# Patient Record
Sex: FEMALE | Race: BLACK OR AFRICAN AMERICAN | NOT HISPANIC OR LATINO | URBAN - METROPOLITAN AREA
[De-identification: names, ages, dates, MRNs, and addresses within clinical notes are randomized per-mention and may not be internally consistent; named-entity substitution may affect disease eponyms.]

---

## 2024-11-12 ENCOUNTER — NURSING HOME VISIT (OUTPATIENT)
Dept: POST ACUTE CARE | Facility: EXTERNAL LOCATION | Age: 74
End: 2024-11-12
Payer: MEDICARE

## 2024-11-12 DIAGNOSIS — D64.9 ANEMIA, UNSPECIFIED TYPE: ICD-10-CM

## 2024-11-12 DIAGNOSIS — B99.9 INFECTION: ICD-10-CM

## 2024-11-12 DIAGNOSIS — E13.69 OTHER SPECIFIED DIABETES MELLITUS WITH OTHER SPECIFIED COMPLICATION, UNSPECIFIED WHETHER LONG TERM INSULIN USE (MULTI): ICD-10-CM

## 2024-11-12 DIAGNOSIS — C84.70 ANAPLASTIC ALK-NEGATIVE LARGE CELL LYMPHOMA, UNSPECIFIED BODY REGION (MULTI): Primary | ICD-10-CM

## 2024-11-12 DIAGNOSIS — I10 ESSENTIAL (PRIMARY) HYPERTENSION: ICD-10-CM

## 2024-11-12 DIAGNOSIS — E78.5 HYPERLIPIDEMIA, UNSPECIFIED HYPERLIPIDEMIA TYPE: ICD-10-CM

## 2024-11-12 DIAGNOSIS — J44.9 CHRONIC OBSTRUCTIVE PULMONARY DISEASE, UNSPECIFIED COPD TYPE (MULTI): ICD-10-CM

## 2024-11-12 PROBLEM — R53.1 WEAKNESS: Status: ACTIVE | Noted: 2024-11-12

## 2024-11-12 PROCEDURE — 99305 1ST NF CARE MODERATE MDM 35: CPT | Performed by: INTERNAL MEDICINE

## 2024-11-12 NOTE — ASSESSMENT & PLAN NOTE
Monitor for signs symptoms of hyper or hypoglycemia.  Monitor labs  LCS diet.  Glucoscan  Metformin  Sliding scale insulin  Insulin glargine

## 2024-11-12 NOTE — LETTER
Patient: Eliza Palafox  : 1950    Encounter Date: 2024    HISTORY & PHYSICAL    Subjective  Chief complaint: Eliza Palafox is a 74 y.o. female who is being seen and evaluated for multiple medical problems.  Patient admitted to SNF for therapy due to weakness after recent hospitalization.    HPI:  HPI  Patient is a 74-year-old female presenting for admission to nursing facility following hospitalization.  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  Patient is currently receiving treatment and follows with oncology hematology.  Patient did have recent procedure for cancer with debridement of area through nasal cavity, continues on antibiotics to complete outpatient.  PT OT evaluated patient with recommendations for further therapy at SNF.  Patient was seen and examined at the bedside, appears to be in no acute distress.  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    Family History   Problem Relation Name Age of Onset   • No Known Problems Mother     • No Known Problems Father         Social History     Socioeconomic History   • Marital status:        Vital signs: Vital signs reviewed per  facility EHR    Objective  Physical Exam  Constitutional:       General: She is not in acute distress.  Eyes:      Extraocular Movements: Extraocular movements intact.   Cardiovascular:      Rate and Rhythm: Normal rate and regular rhythm.   Pulmonary:      Effort: Pulmonary effort is normal.      Breath sounds: Normal breath sounds.   Abdominal:      General: Bowel sounds are normal.      Palpations: Abdomen is soft.   Musculoskeletal:      Cervical back: Neck supple.      Right lower leg: No edema.      Left lower leg: No edema.   Neurological:      Mental Status: She is alert.   Psychiatric:         Mood and Affect: Mood normal.         Behavior: Behavior is cooperative.         Assessment/Plan  Problem List Items Addressed This Visit       COPD (chronic obstructive pulmonary disease) (Multi)     Monitor for shortness of breath or wheezing.  Inhaler         Essential (primary) hypertension     Monitor blood pressure         Diabetes mellitus (Multi)     Monitor for signs symptoms of hyper or hypoglycemia.  Monitor labs  LCS diet.  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, unspecified site (Multi) - Primary     With mets  Follows outpatient  Receiving treatment  Pain control         Anemia     Monitor labs         Hyperlipidemia     Statin  Ezetimibe  Monitor labs         Infection     Continue amoxicillin, doxycycline          Hospital records reviewed  Medications, treatments, and labs reviewed  Continue medications and treatments as listed in EMR  Discussed with nursing and therapy      Scribe Attestation  I, Nick Berry   attest that this documentation has been prepared under the direction and in the presence of Janae Ivan MD    Provider Attestation - Scribe documentation  All medical record entries made by the Scribe were at my direction and personally dictated by me. I have reviewed the chart and agree that the record  accurately reflects my personal performance of the history, physical exam, discussion and plan.   Janae Ivan MD      Electronically Signed By: Janae Ivan MD   11/13/24  1:01 PM

## 2024-11-12 NOTE — PROGRESS NOTES
HISTORY & PHYSICAL    Subjective   Chief complaint: Eliza Palafox is a 74 y.o. female who is being seen and evaluated for multiple medical problems.  Patient admitted to SNF for therapy due to weakness after recent hospitalization.    HPI:  HPI  Patient is a 74-year-old female presenting for admission to nursing facility following hospitalization.  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  Patient is currently receiving treatment and follows with oncology hematology.  Patient did have recent procedure for cancer with debridement of area through nasal cavity, continues on antibiotics to complete outpatient.  PT OT evaluated patient with recommendations for further therapy at SNF.  Patient was seen and examined at the bedside, appears to be in no acute distress.  Denies chest pain, shortness of breath, nausea vomiting fever chills  Past Medical History:   Diagnosis Date    Anaplastic large cell lymphoma, ALK-negative, unspecified site (Multi)     Anemia     CHF (congestive heart failure)     COPD (chronic obstructive pulmonary disease) (Multi)     Diabetes mellitus (Multi)     Essential (primary) hypertension 12/31/2013    Benign essential hypertension    Hyperlipidemia     Malignant neoplasm of unspecified part of right bronchus or lung (Multi)     CESAR (obstructive sleep apnea)     Personal history of other endocrine, nutritional and metabolic disease 11/11/2014    History of diabetes mellitus    Sciatica        No past surgical history on file.    Family History   Problem Relation Name Age of Onset    No Known Problems Mother      No Known Problems Father         Social History     Socioeconomic History    Marital status:        Vital signs: Vital signs reviewed per facility EHR    Objective   Physical Exam  Constitutional:       General: She is not in  acute distress.  Eyes:      Extraocular Movements: Extraocular movements intact.   Cardiovascular:      Rate and Rhythm: Normal rate and regular rhythm.   Pulmonary:      Effort: Pulmonary effort is normal.      Breath sounds: Normal breath sounds.   Abdominal:      General: Bowel sounds are normal.      Palpations: Abdomen is soft.   Musculoskeletal:      Cervical back: Neck supple.      Right lower leg: No edema.      Left lower leg: No edema.   Neurological:      Mental Status: She is alert.   Psychiatric:         Mood and Affect: Mood normal.         Behavior: Behavior is cooperative.         Assessment/Plan   Problem List Items Addressed This Visit       COPD (chronic obstructive pulmonary disease) (Multi)     Monitor for shortness of breath or wheezing.  Inhaler         Essential (primary) hypertension     Monitor blood pressure         Diabetes mellitus (Multi)     Monitor for signs symptoms of hyper or hypoglycemia.  Monitor labs  LCS diet.  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, unspecified site (Multi) - Primary     With mets  Follows outpatient  Receiving treatment  Pain control         Anemia     Monitor labs         Hyperlipidemia     Statin  Ezetimibe  Monitor labs         Infection     Continue amoxicillin, doxycycline          Hospital records reviewed  Medications, treatments, and labs reviewed  Continue medications and treatments as listed in EMR  Discussed with nursing and therapy      Scribe Attestation  I, Justo Berryibemerson   attest that this documentation has been prepared under the direction and in the presence of Janae Ivan MD    Provider Attestation - Scribe documentation  All medical record entries made by the Scribe were at my direction and personally dictated by me. I have reviewed the chart and agree that the record accurately reflects my personal performance of the history, physical exam, discussion  and plan.   Janae Ivan MD

## 2024-11-13 ENCOUNTER — NURSING HOME VISIT (OUTPATIENT)
Dept: POST ACUTE CARE | Facility: EXTERNAL LOCATION | Age: 74
End: 2024-11-13
Payer: MEDICARE

## 2024-11-13 DIAGNOSIS — R53.1 WEAKNESS: ICD-10-CM

## 2024-11-13 DIAGNOSIS — I10 ESSENTIAL (PRIMARY) HYPERTENSION: ICD-10-CM

## 2024-11-13 DIAGNOSIS — J44.9 CHRONIC OBSTRUCTIVE PULMONARY DISEASE, UNSPECIFIED COPD TYPE (MULTI): Primary | ICD-10-CM

## 2024-11-13 DIAGNOSIS — D64.9 ANEMIA, UNSPECIFIED TYPE: ICD-10-CM

## 2024-11-13 PROCEDURE — 99308 SBSQ NF CARE LOW MDM 20: CPT | Performed by: REGISTERED NURSE

## 2024-11-13 NOTE — LETTER
Patient: Eliza Palafox  : 1950    Encounter Date: 2024    PROGRESS NOTE    Subjective  Chief complaint: Eliza Palafox is a 74 y.o. female who is an acute skilled patient being seen and evaluated for weakness    HPI:  Patient in therapy d/t generalized weakness. Patient presents for f/u. Continues to work toward goals in therapy. No new complaints at this time.    Objective  Vital signs: 129/66, 97.8, 133, 18, 98%    Physical Exam  Constitutional:       General: She is not in acute distress.  Eyes:      Extraocular Movements: Extraocular movements intact.   Pulmonary:      Effort: Pulmonary effort is normal.   Musculoskeletal:      Cervical back: Neck supple.   Neurological:      Mental Status: She is alert.   Psychiatric:         Mood and Affect: Mood normal.         Behavior: Behavior is cooperative.         Assessment/Plan  Problem List Items Addressed This Visit       COPD (chronic obstructive pulmonary disease) (Multi) - Primary     Monitor for shortness of breath or wheezing.  Inhaler         Essential (primary) hypertension     Monitor blood pressure  Continue current medications          Weakness     Therapy          Anemia     Monitor labs          Medications, treatments, and labs reviewed  Continue medications and treatments as listed in [facility]    Scribe Attestation  I, Nick Hayes   attest that this documentation has been prepared under the direction and in the presence of DREA Chapman.    Provider Attestation - Scribe documentation  All medical record entries made by the Scribe were at my direction and personally dictated by me. I have reviewed the chart and agree that the record accurately reflects my personal performance of the history, physical exam, discussion and plan.    DREA Chapman        Electronically Signed By: DREA Chapman   24  3:56 PM

## 2024-11-14 ENCOUNTER — NURSING HOME VISIT (OUTPATIENT)
Dept: POST ACUTE CARE | Facility: EXTERNAL LOCATION | Age: 74
End: 2024-11-14
Payer: MEDICARE

## 2024-11-14 DIAGNOSIS — J44.9 CHRONIC OBSTRUCTIVE PULMONARY DISEASE, UNSPECIFIED COPD TYPE (MULTI): ICD-10-CM

## 2024-11-14 DIAGNOSIS — I10 ESSENTIAL (PRIMARY) HYPERTENSION: ICD-10-CM

## 2024-11-14 DIAGNOSIS — C84.70 ANAPLASTIC ALK-NEGATIVE LARGE CELL LYMPHOMA, UNSPECIFIED BODY REGION (MULTI): Primary | ICD-10-CM

## 2024-11-14 DIAGNOSIS — E13.69 OTHER SPECIFIED DIABETES MELLITUS WITH OTHER SPECIFIED COMPLICATION, UNSPECIFIED WHETHER LONG TERM INSULIN USE (MULTI): ICD-10-CM

## 2024-11-14 DIAGNOSIS — R53.1 WEAKNESS: ICD-10-CM

## 2024-11-14 PROCEDURE — 99309 SBSQ NF CARE MODERATE MDM 30: CPT | Performed by: INTERNAL MEDICINE

## 2024-11-14 NOTE — PROGRESS NOTES
PROGRESS NOTE    Subjective   Chief complaint: Eliza Palafox is a 74 y.o. female who is an acute skilled patient being seen and evaluated for weakness    HPI:  Patient in therapy d/t generalized weakness. Patient presents for f/u. Continues to work toward goals in therapy. No new complaints at this time.    Objective   Vital signs: 129/66, 97.8, 133, 18, 98%    Physical Exam  Constitutional:       General: She is not in acute distress.  Eyes:      Extraocular Movements: Extraocular movements intact.   Pulmonary:      Effort: Pulmonary effort is normal.   Musculoskeletal:      Cervical back: Neck supple.   Neurological:      Mental Status: She is alert.   Psychiatric:         Mood and Affect: Mood normal.         Behavior: Behavior is cooperative.         Assessment/Plan   Problem List Items Addressed This Visit       COPD (chronic obstructive pulmonary disease) (Multi) - Primary     Monitor for shortness of breath or wheezing.  Inhaler         Essential (primary) hypertension     Monitor blood pressure  Continue current medications          Weakness     Therapy          Anemia     Monitor labs          Medications, treatments, and labs reviewed  Continue medications and treatments as listed in [facility]    Scribe Attestation  [place] Scribe   attest that this documentation has been prepared under the direction and in the presence of DREA Chapman.    Provider Attestation - Scribe documentation  All medical record entries made by the Scribe were at my direction and personally dictated by me. I have reviewed the chart and agree that the record accurately reflects my personal performance of the history, physical exam, discussion and plan.    DREA Chapman

## 2024-11-14 NOTE — PROGRESS NOTES
PROGRESS NOTE    Subjective   Chief complaint: Eliza Palafox is a 74 y.o. female who is an acute skilled patient being seen and evaluated for weakness    HPI:  HPI  Patient presents for f/u therapy and general medical care.  Patient seen and examined at bedside.  Therapy has been working with the patient to improve strength, endurance, ADLs, and transfers d/t generalized weakness.  Patient is working towards goals.  Patient noted with ulcer to left ankle, wound team is following.  Patient did have a follow-up with oncology and was recommended for possible hospice and patient refusing hospice at this time.    Objective   Vital signs: 129/66, 18, 98% blood sugar 146    Physical Exam  Constitutional:       General: She is not in acute distress.  Eyes:      Extraocular Movements: Extraocular movements intact.   Cardiovascular:      Rate and Rhythm: Normal rate and regular rhythm.   Pulmonary:      Effort: Pulmonary effort is normal.      Breath sounds: Normal breath sounds.   Abdominal:      General: Bowel sounds are normal.      Palpations: Abdomen is soft.   Musculoskeletal:      Cervical back: Neck supple.      Right lower leg: No edema.      Left lower leg: No edema.   Neurological:      Mental Status: She is alert.      Motor: Weakness present.   Psychiatric:         Mood and Affect: Mood normal.         Behavior: Behavior is cooperative.         Assessment/Plan   Problem List Items Addressed This Visit       COPD (chronic obstructive pulmonary disease) (Multi)     Monitor for shortness of breath or wheezing.  Inhaler         Essential (primary) hypertension     Monitor blood pressure         Diabetes mellitus (Multi)     Monitor for signs symptoms of hyper or hypoglycemia.  Monitor labs  LCS diet.  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, unspecified site (Multi) - Primary     With mets  Follows outpatient  Receiving treatment  Pain  control         Weakness     Continue therapy          Medications, treatments, and labs reviewed  Continue medications and treatments as listed in EMR      Scribe Attestation  I, Nick Berry   attest that this documentation has been prepared under the direction and in the presence of Janae Ivan MD    Provider Attestation - Scribe documentation  All medical record entries made by the Scribe were at my direction and personally dictated by me. I have reviewed the chart and agree that the record accurately reflects my personal performance of the history, physical exam, discussion and plan.   Janae Ivan MD

## 2024-11-14 NOTE — LETTER
Patient: Eliza Palafox  : 1950    Encounter Date: 2024    PROGRESS NOTE    Subjective  Chief complaint: Eliza Palafox is a 74 y.o. female who is an acute skilled patient being seen and evaluated for weakness    HPI:  HPI  Patient presents for f/u therapy and general medical care.  Patient seen and examined at bedside.  Therapy has been working with the patient to improve strength, endurance, ADLs, and transfers d/t generalized weakness.  Patient is working towards goals.  Patient noted with ulcer to left ankle, wound team is following.  Patient did have a follow-up with oncology and was recommended for possible hospice and patient refusing hospice at this time.    Objective  Vital signs: 129/66, 18, 98% blood sugar 146    Physical Exam  Constitutional:       General: She is not in acute distress.  Eyes:      Extraocular Movements: Extraocular movements intact.   Cardiovascular:      Rate and Rhythm: Normal rate and regular rhythm.   Pulmonary:      Effort: Pulmonary effort is normal.      Breath sounds: Normal breath sounds.   Abdominal:      General: Bowel sounds are normal.      Palpations: Abdomen is soft.   Musculoskeletal:      Cervical back: Neck supple.      Right lower leg: No edema.      Left lower leg: No edema.   Neurological:      Mental Status: She is alert.      Motor: Weakness present.   Psychiatric:         Mood and Affect: Mood normal.         Behavior: Behavior is cooperative.         Assessment/Plan  Problem List Items Addressed This Visit       COPD (chronic obstructive pulmonary disease) (Multi)     Monitor for shortness of breath or wheezing.  Inhaler         Essential (primary) hypertension     Monitor blood pressure         Diabetes mellitus (Multi)     Monitor for signs symptoms of hyper or hypoglycemia.  Monitor labs  LCS diet.  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, unspecified site (Multi) -  Primary     With mets  Follows outpatient  Receiving treatment  Pain control         Weakness     Continue therapy          Medications, treatments, and labs reviewed  Continue medications and treatments as listed in EMR      Scribe Attestation  I, Nick Berry   attest that this documentation has been prepared under the direction and in the presence of Janae Ivan MD    Provider Attestation - Scribe documentation  All medical record entries made by the Scribe were at my direction and personally dictated by me. I have reviewed the chart and agree that the record accurately reflects my personal performance of the history, physical exam, discussion and plan.   Janae Ivan MD        Electronically Signed By: Janae Ivan MD   11/15/24 11:47 AM

## 2024-11-20 ENCOUNTER — NURSING HOME VISIT (OUTPATIENT)
Dept: POST ACUTE CARE | Facility: EXTERNAL LOCATION | Age: 74
End: 2024-11-20
Payer: MEDICARE

## 2024-11-20 DIAGNOSIS — C84.70 ANAPLASTIC ALK-NEGATIVE LARGE CELL LYMPHOMA, UNSPECIFIED BODY REGION (MULTI): ICD-10-CM

## 2024-11-20 DIAGNOSIS — R53.1 WEAKNESS: Primary | ICD-10-CM

## 2024-11-20 DIAGNOSIS — J44.9 CHRONIC OBSTRUCTIVE PULMONARY DISEASE, UNSPECIFIED COPD TYPE (MULTI): ICD-10-CM

## 2024-11-20 PROCEDURE — 99308 SBSQ NF CARE LOW MDM 20: CPT | Performed by: REGISTERED NURSE

## 2024-11-20 NOTE — LETTER
Patient: Eliza Palafox  : 1950    Encounter Date: 2024    PROGRESS NOTE    Subjective  Chief complaint: Eliza Palafox is a 74 y.o. female who is an acute skilled patient being seen and evaluated for weakness    HPI:  Patient in therapy d/t generalized weakness. Patient presents for f/u. Continues to work toward goals in therapy. No new complaints at this time.    Objective  Vital signs: 120/60, 98, 90, 16, 96%    Physical Exam  Constitutional:       General: She is not in acute distress.  Eyes:      Extraocular Movements: Extraocular movements intact.   Pulmonary:      Effort: Pulmonary effort is normal.   Musculoskeletal:      Cervical back: Neck supple.   Neurological:      Mental Status: She is alert.   Psychiatric:         Mood and Affect: Mood normal.         Behavior: Behavior is cooperative.         Assessment/Plan  Problem List Items Addressed This Visit       COPD (chronic obstructive pulmonary disease) (Multi)     Monitor for shortness of breath or wheezing.  Inhaler         Anaplastic large cell lymphoma, ALK-negative, unspecified site (Multi)     With mets  Follows outpatient  Receiving treatment  Pain control         Weakness - Primary     Therapy           Medications, treatments, and labs reviewed  Continue medications and treatments as listed in [facility]    Scribe Attestation  I, Nick Hayes   attest that this documentation has been prepared under the direction and in the presence of DREA Chapman.    Provider Attestation - Scribe documentation  All medical record entries made by the Scribe were at my direction and personally dictated by me. I have reviewed the chart and agree that the record accurately reflects my personal performance of the history, physical exam, discussion and plan.    DREA Chapman        Electronically Signed By: DREA Chapman   24  8:27 AM  
Mother/Patient

## 2024-11-21 ENCOUNTER — NURSING HOME VISIT (OUTPATIENT)
Dept: POST ACUTE CARE | Facility: EXTERNAL LOCATION | Age: 74
End: 2024-11-21
Payer: MEDICARE

## 2024-11-21 DIAGNOSIS — L03.90 CELLULITIS, UNSPECIFIED CELLULITIS SITE: Primary | ICD-10-CM

## 2024-11-21 DIAGNOSIS — I10 ESSENTIAL (PRIMARY) HYPERTENSION: ICD-10-CM

## 2024-11-21 DIAGNOSIS — J44.9 CHRONIC OBSTRUCTIVE PULMONARY DISEASE, UNSPECIFIED COPD TYPE (MULTI): ICD-10-CM

## 2024-11-21 DIAGNOSIS — C84.70 ANAPLASTIC ALK-NEGATIVE LARGE CELL LYMPHOMA, UNSPECIFIED BODY REGION (MULTI): ICD-10-CM

## 2024-11-21 DIAGNOSIS — E13.69 OTHER SPECIFIED DIABETES MELLITUS WITH OTHER SPECIFIED COMPLICATION, UNSPECIFIED WHETHER LONG TERM INSULIN USE (MULTI): ICD-10-CM

## 2024-11-21 DIAGNOSIS — E78.5 HYPERLIPIDEMIA, UNSPECIFIED HYPERLIPIDEMIA TYPE: ICD-10-CM

## 2024-11-21 PROCEDURE — 99305 1ST NF CARE MODERATE MDM 35: CPT | Performed by: INTERNAL MEDICINE

## 2024-11-21 NOTE — PROGRESS NOTES
HISTORY & PHYSICAL    Subjective   Chief complaint: Eliza Palafox is a 74 y.o. female who is being seen and evaluated for multiple medical problems.  Patient admitted to SNF for therapy due to weakness after recent hospitalization.    HPI:  HPI  Patient is a 74-year-old female presenting for admission nursing facility following hospitalization.  Patient has a past medical history significant for anaplastic large cell lymphoma, COPD, CKD, diabetes, hypertension, hyperlipidemia, CESAR.  Patient was started on antibiotics for cellulitis, tolerating without adverse effects.  Patient was hemodynamically stable to discharge to SNF for continued medical management and further therapy.  Patient seen and examined at the bedside, appears to be in no acute distress.  Denies chest pain, shortness of breath, nausea vomiting fever chills.  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    Family History   Problem Relation Name Age of Onset    No Known Problems Mother      No Known Problems Father         Social History     Socioeconomic History    Marital status:        Vital signs: 120/60, 90, 16, 98.0, 96% blood sugar 150    Objective   Physical Exam  Constitutional:       General: She is not in acute distress.  Eyes:      Extraocular Movements: Extraocular movements intact.   Cardiovascular:      Rate and Rhythm: Normal rate and regular rhythm.    Pulmonary:      Effort: Pulmonary effort is normal.      Breath sounds: Normal breath sounds.   Abdominal:      General: Bowel sounds are normal.      Palpations: Abdomen is soft.   Musculoskeletal:      Cervical back: Neck supple.      Right lower leg: No edema.      Left lower leg: No edema.   Neurological:      Mental Status: She is alert.      Motor: Weakness present.   Psychiatric:         Mood and Affect: Mood normal.         Behavior: Behavior is cooperative.         Assessment/Plan   Problem List Items Addressed This Visit       COPD (chronic obstructive pulmonary disease) (Multi)     Monitor for shortness of breath or wheezing.  Inhaler         Essential (primary) hypertension     Monitor blood pressure         Diabetes mellitus (Multi)     Monitor for signs symptoms of hyper or hypoglycemia.  Monitor labs  LCS diet.  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, unspecified site (Multi)     With mets  Follows outpatient  Receiving treatment  Pain control         Hyperlipidemia     Statin  Ezetimibe  Monitor labs         Cellulitis - Primary     Clindamycin 11/22/2024   Sulfamethoxazole 11/24/2024  Start amoxicillin tomorrow and continue thru 12/5/2024  Start doxycycline tomorrow and continue thru 12/5/2024          Hospital records reviewed  Medications, treatments, and labs reviewed  Continue medications and treatments as listed in EMR  Discussed with nursing and therapy      Scribe Attestation  I, Praveena Robledo Scribe   attest that this documentation has been prepared under the direction and in the presence of Janae Ivan MD    Provider Attestation - Scribe documentation  All medical record entries made by the Scribe were at my direction and personally dictated by me. I have reviewed the chart and agree that the record accurately reflects my personal performance of the history, physical exam, discussion and plan.   Janae MACKENZIE  MD [place]

## 2024-11-21 NOTE — ASSESSMENT & PLAN NOTE
Clindamycin 11/22/2024   Sulfamethoxazole 11/24/2024  Start amoxicillin tomorrow and continue thru 12/5/2024  Start doxycycline tomorrow and continue thru 12/5/2024

## 2024-11-21 NOTE — LETTER
Patient: Eliza Palafox  : 1950    Encounter Date: 2024    HISTORY & PHYSICAL    Subjective  Chief complaint: Eliza Palafox is a 74 y.o. female who is being seen and evaluated for multiple medical problems.  Patient admitted to SNF for therapy due to weakness after recent hospitalization.    HPI:  HPI  Patient is a 74-year-old female presenting for admission nursing facility following hospitalization.  Patient has a past medical history significant for anaplastic large cell lymphoma, COPD, CKD, diabetes, hypertension, hyperlipidemia, CESAR.  Patient was started on antibiotics for cellulitis, tolerating without adverse effects.  Patient was hemodynamically stable to discharge to SNF for continued medical management and further therapy.  Patient seen and examined at the bedside, appears to be in no acute distress.  Denies chest pain, shortness of breath, nausea vomiting fever chills.  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    Family History   Problem Relation Name Age of Onset   • No Known Problems Mother     • No Known Problems Father         Social History     Socioeconomic History   • Marital status:        Vital signs: 120/60, 90, 16, 98.0, 96% blood sugar 150    Objective  Physical Exam  Constitutional:       General: She is not in acute distress.  Eyes:      Extraocular Movements: Extraocular movements  intact.   Cardiovascular:      Rate and Rhythm: Normal rate and regular rhythm.   Pulmonary:      Effort: Pulmonary effort is normal.      Breath sounds: Normal breath sounds.   Abdominal:      General: Bowel sounds are normal.      Palpations: Abdomen is soft.   Musculoskeletal:      Cervical back: Neck supple.      Right lower leg: No edema.      Left lower leg: No edema.   Neurological:      Mental Status: She is alert.      Motor: Weakness present.   Psychiatric:         Mood and Affect: Mood normal.         Behavior: Behavior is cooperative.         Assessment/Plan  Problem List Items Addressed This Visit       COPD (chronic obstructive pulmonary disease) (Multi)     Monitor for shortness of breath or wheezing.  Inhaler         Essential (primary) hypertension     Monitor blood pressure         Diabetes mellitus (Multi)     Monitor for signs symptoms of hyper or hypoglycemia.  Monitor labs  LCS diet.  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, unspecified site (Multi)     With mets  Follows outpatient  Receiving treatment  Pain control         Hyperlipidemia     Statin  Ezetimibe  Monitor labs         Cellulitis - Primary     Clindamycin 11/22/2024   Sulfamethoxazole 11/24/2024  Start amoxicillin tomorrow and continue thru 12/5/2024  Start doxycycline tomorrow and continue thru 12/5/2024          Hospital records reviewed  Medications, treatments, and labs reviewed  Continue medications and treatments as listed in EMR  Discussed with nursing and therapy      Scribe Attestation  IPraveena Scribe   attest that this documentation has been prepared under the direction and in the presence of Janae Ivan MD    Provider Attestation - Scribe documentation  All medical record entries made by the Scribe were at my direction and personally dictated by me. I have reviewed the chart and agree that the record accurately reflects my  personal performance of the history, physical exam, discussion and plan.   Janae Ivan MD      Electronically Signed By: Janae Ivan MD   11/22/24  3:01 PM

## 2024-11-21 NOTE — PROGRESS NOTES
PROGRESS NOTE    Subjective   Chief complaint: Eliza Palafox is a 74 y.o. female who is an acute skilled patient being seen and evaluated for weakness    HPI:  Patient in therapy d/t generalized weakness. Patient presents for f/u. Continues to work toward goals in therapy. No new complaints at this time.    Objective   Vital signs: 120/60, 98, 90, 16, 96%    Physical Exam  Constitutional:       General: She is not in acute distress.  Eyes:      Extraocular Movements: Extraocular movements intact.   Pulmonary:      Effort: Pulmonary effort is normal.   Musculoskeletal:      Cervical back: Neck supple.   Neurological:      Mental Status: She is alert.   Psychiatric:         Mood and Affect: Mood normal.         Behavior: Behavior is cooperative.         Assessment/Plan   Problem List Items Addressed This Visit       COPD (chronic obstructive pulmonary disease) (Multi)     Monitor for shortness of breath or wheezing.  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    Scribe Attestation  I, Nick Hayes   attest that this documentation has been prepared under the direction and in the presence of DREA Chapman.    Provider Attestation - Scribe documentation  All medical record entries made by the Scribe were at my direction and personally dictated by me. I have reviewed the chart and agree that the record accurately reflects my personal performance of the history, physical exam, discussion and plan.    DREA Chapman

## 2024-11-22 ENCOUNTER — NURSING HOME VISIT (OUTPATIENT)
Dept: POST ACUTE CARE | Facility: EXTERNAL LOCATION | Age: 74
End: 2024-11-22
Payer: MEDICARE

## 2024-11-22 DIAGNOSIS — J44.9 CHRONIC OBSTRUCTIVE PULMONARY DISEASE, UNSPECIFIED COPD TYPE (MULTI): ICD-10-CM

## 2024-11-22 DIAGNOSIS — C84.70 ANAPLASTIC ALK-NEGATIVE LARGE CELL LYMPHOMA, UNSPECIFIED BODY REGION (MULTI): ICD-10-CM

## 2024-11-22 DIAGNOSIS — R53.1 WEAKNESS: Primary | ICD-10-CM

## 2024-11-22 DIAGNOSIS — N17.9 AKI (ACUTE KIDNEY INJURY) (CMS-HCC): ICD-10-CM

## 2024-11-22 PROCEDURE — 99309 SBSQ NF CARE MODERATE MDM 30: CPT | Performed by: REGISTERED NURSE

## 2024-11-22 NOTE — LETTER
Patient: Eliza Palafox  : 1950    Encounter Date: 2024    PROGRESS NOTE    Subjective  Chief complaint: Eliza Palafox is a 74 y.o. female who is an acute skilled patient being seen and evaluated for weakness    HPI:  Patient seen and examined at bedside. Patient denies n/v/f/c. Continues working in therapy. No new complaints    Objective  Vital signs: 110/69, 97.4, 105, 21, 98%    Physical Exam  Constitutional:       General: She is not in acute distress.  Eyes:      Extraocular Movements: Extraocular movements intact.   Cardiovascular:      Rate and Rhythm: Normal rate and regular rhythm.   Pulmonary:      Effort: Pulmonary effort is normal.      Breath sounds: Normal breath sounds.   Abdominal:      General: Bowel sounds are normal.      Palpations: Abdomen is soft.   Musculoskeletal:      Cervical back: Neck supple.      Right lower leg: No edema.      Left lower leg: No edema.   Neurological:      Mental Status: She is alert.   Psychiatric:         Mood and Affect: Mood normal.         Behavior: Behavior is cooperative.         Assessment/Plan  Problem List Items Addressed This Visit       COPD (chronic obstructive pulmonary disease) (Multi)     Monitor for shortness of breath or wheezing.  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    Provider Attestation - Scribe documentation  All medical record entries made by the Scribe  were at my direction and personally dictated by me. I have reviewed the chart and agree that the record accurately reflects my personal performance of the history, physical exam, discussion and plan.    DREA Chapman        Electronically Signed By: DREA Chapman   12/2/24  5:20 PM

## 2024-11-26 NOTE — PROGRESS NOTES
PROGRESS NOTE    Subjective   Chief complaint: Eliza Palafox is a 74 y.o. female who is an acute skilled patient being seen and evaluated for weakness    HPI:  Patient seen and examined at bedside. Patient denies n/v/f/c. Continues working in therapy. No new complaints    Objective   Vital signs: 110/69, 97.4, 105, 21, 98%    Physical Exam  Constitutional:       General: She is not in acute distress.  Eyes:      Extraocular Movements: Extraocular movements intact.   Cardiovascular:      Rate and Rhythm: Normal rate and regular rhythm.   Pulmonary:      Effort: Pulmonary effort is normal.      Breath sounds: Normal breath sounds.   Abdominal:      General: Bowel sounds are normal.      Palpations: Abdomen is soft.   Musculoskeletal:      Cervical back: Neck supple.      Right lower leg: No edema.      Left lower leg: No edema.   Neurological:      Mental Status: She is alert.   Psychiatric:         Mood and Affect: Mood normal.         Behavior: Behavior is cooperative.         Assessment/Plan   Problem List Items Addressed This Visit       COPD (chronic obstructive pulmonary disease) (Multi)     Monitor for shortness of breath or wheezing.  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    Provider Attestation - Scribe documentation  All medical record entries made by the Scribe were at my direction and personally dictated by me. I have reviewed  the chart and agree that the record accurately reflects my personal performance of the history, physical exam, discussion and plan.    Andre Mcgarry, APRN-CNP

## 2024-11-27 ENCOUNTER — NURSING HOME VISIT (OUTPATIENT)
Dept: POST ACUTE CARE | Facility: EXTERNAL LOCATION | Age: 74
End: 2024-11-27
Payer: MEDICARE

## 2024-11-27 DIAGNOSIS — I10 ESSENTIAL (PRIMARY) HYPERTENSION: ICD-10-CM

## 2024-11-27 DIAGNOSIS — R53.1 WEAKNESS: Primary | ICD-10-CM

## 2024-11-27 DIAGNOSIS — J44.9 CHRONIC OBSTRUCTIVE PULMONARY DISEASE, UNSPECIFIED COPD TYPE (MULTI): ICD-10-CM

## 2024-11-27 DIAGNOSIS — D64.9 ANEMIA, UNSPECIFIED TYPE: ICD-10-CM

## 2024-11-27 PROCEDURE — 99308 SBSQ NF CARE LOW MDM 20: CPT | Performed by: REGISTERED NURSE

## 2024-11-27 NOTE — LETTER
Patient: Eliza Palafox  : 1950    Encounter Date: 2024    PROGRESS NOTE    Subjective  Chief complaint: Eliza Palafox is a 74 y.o. female who is an acute skilled patient being seen and evaluated for weakness    HPI:  Patient seen and examined at bedside. Patient denies n/v/f/c. Continues working in therapy. No new complaints.    Objective  Vital signs: 109/70, 98.2, 94, 18, 98%    Physical Exam  Constitutional:       General: She is not in acute distress.  Eyes:      Extraocular Movements: Extraocular movements intact.   Pulmonary:      Effort: Pulmonary effort is normal.   Musculoskeletal:      Cervical back: Neck supple.   Neurological:      Mental Status: She is alert.   Psychiatric:         Mood and Affect: Mood normal.         Behavior: Behavior is cooperative.         Assessment/Plan  Problem List Items Addressed This Visit       COPD (chronic obstructive pulmonary disease) (Multi)     Monitor for shortness of breath or wheezing.  Inhaler         Essential (primary) hypertension     Monitor blood pressure  Continue current medications          Weakness - Primary     Therapy          Anemia     Monitor labs          Medications, treatments, and labs reviewed  Continue medications and treatments as listed in [facility]    Scribe Attestation  ISydney Scribe   attest that this documentation has been prepared under the direction and in the presence of DREA Chapman.    Provider Attestation - Scribe documentation  All medical record entries made by the Scribe were at my direction and personally dictated by me. I have reviewed the chart and agree that the record accurately reflects my personal performance of the history, physical exam, discussion and plan.    DREA Chapman        Electronically Signed By: DREA Chapman   24  5:42 PM

## 2024-11-29 ENCOUNTER — NURSING HOME VISIT (OUTPATIENT)
Dept: POST ACUTE CARE | Facility: EXTERNAL LOCATION | Age: 74
End: 2024-11-29
Payer: MEDICARE

## 2024-11-29 DIAGNOSIS — E13.69 OTHER SPECIFIED DIABETES MELLITUS WITH OTHER SPECIFIED COMPLICATION, UNSPECIFIED WHETHER LONG TERM INSULIN USE (MULTI): ICD-10-CM

## 2024-11-29 DIAGNOSIS — I10 ESSENTIAL (PRIMARY) HYPERTENSION: Primary | ICD-10-CM

## 2024-11-29 DIAGNOSIS — L03.90 CELLULITIS, UNSPECIFIED CELLULITIS SITE: ICD-10-CM

## 2024-11-29 DIAGNOSIS — J44.9 CHRONIC OBSTRUCTIVE PULMONARY DISEASE, UNSPECIFIED COPD TYPE (MULTI): ICD-10-CM

## 2024-11-29 DIAGNOSIS — C84.70 ANAPLASTIC ALK-NEGATIVE LARGE CELL LYMPHOMA, UNSPECIFIED BODY REGION (MULTI): ICD-10-CM

## 2024-11-29 DIAGNOSIS — E78.5 HYPERLIPIDEMIA, UNSPECIFIED HYPERLIPIDEMIA TYPE: ICD-10-CM

## 2024-11-29 DIAGNOSIS — N17.9 AKI (ACUTE KIDNEY INJURY) (CMS-HCC): ICD-10-CM

## 2024-11-29 PROCEDURE — 99305 1ST NF CARE MODERATE MDM 35: CPT | Performed by: INTERNAL MEDICINE

## 2024-11-29 NOTE — ASSESSMENT & PLAN NOTE
Continue amoxicillin 12/8/2024  Continue doxycycline 12/8/2024  Treatment per facility EMR  Followed by facility wound team

## 2024-11-29 NOTE — PROGRESS NOTES
HISTORY & PHYSICAL    Subjective   Chief complaint: Eliza Palafox is a 74 y.o. female who is being seen and evaluated for multiple medical problems.  Patient presents for admission to nursing facility.    HPI:  HPI  An interactive audio and/or video telecommunication system which permits real time communications between the patient (at the originating site) and provider (at a distant site) was utilized to provide this telehealth service after obtaining verbal consent.  Patient is a 74-year-old female presenting for admission nursing facility.  Patient was admitted to the hospital and treated for RYLAND and tachycardia.  Patient's labs stabilized.  Patient has a past history significant for lung cancer with mets, anemia, COPD, CHF, diabetes, hypertension, hyperlipidemia and sciatica, patient also continues with wound infection followed by facility wound team and continues on antibiotics..  Therapy evaluated patient recommending return to SNF for continued therapy and medical management.  Patient was hemodynamically stable to discharge to SNF for continued medical management and further therapy.  Patient appears to be in no acute distress.  Denies constitutional symptoms.  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    Family History   Problem Relation Name Age of Onset    No Known Problems Mother      No Known Problems Father         Social  History     Socioeconomic History    Marital status:        Vital signs: 130/65, 90, 16, 98.0, 98% blood sugar 88    Objective   Physical Exam  Constitutional:       General: She is not in acute distress.  Eyes:      Extraocular Movements: Extraocular movements intact.   Pulmonary:      Effort: Pulmonary effort is normal.   Musculoskeletal:      Cervical back: Neck supple.   Skin:     Comments: Dressing right foot clean dry and intact   Neurological:      Mental Status: She is alert.   Psychiatric:         Mood and Affect: Mood normal.         Behavior: Behavior is cooperative.         Assessment/Plan   Problem List Items Addressed This Visit       COPD (chronic obstructive pulmonary disease) (Multi)     Monitor for shortness of breath or wheezing.  Inhaler         Essential (primary) hypertension - Primary     Monitor blood pressure  Atenolol         Diabetes mellitus (Multi)     Monitor for signs symptoms of hyper or hypoglycemia.  Monitor labs  LCS diet.  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, unspecified site (Multi)     With mets  Follows outpatient  Receiving treatment  Pain control  Lumakras         Hyperlipidemia     Statin  Ezetimibe  Monitor labs         Cellulitis     Continue amoxicillin 12/8/2024  Continue doxycycline 12/8/2024  Treatment per facility EMR  Followed by facility wound team         RYLAND (acute kidney injury) (CMS-HCC)     Stable  Monitor labs          Hospital records reviewed  Medications, treatments, and labs reviewed  Continue medications and treatments as listed in EMR  Discussed with nursing and therapy      Scribe Attestation  I, Nick Berry   attest that this documentation has been prepared under the direction and in the presence of Janae Ivan MD    Provider Attestation - Scribe documentation  All medical record entries made by the Scribe were at my direction and personally dictated  by me. I have reviewed the chart and agree that the record accurately reflects my personal performance of the history, physical exam, discussion and plan.   Janae Ivan MD

## 2024-11-29 NOTE — LETTER
Patient: Eliza Palafox  : 1950    Encounter Date: 2024    HISTORY & PHYSICAL    Subjective  Chief complaint: Eliza Palafox is a 74 y.o. female who is being seen and evaluated for multiple medical problems.  Patient presents for admission to nursing facility.    HPI:  HPI  An interactive audio and/or video telecommunication system which permits real time communications between the patient (at the originating site) and provider (at a distant site) was utilized to provide this telehealth service after obtaining verbal consent.  Patient is a 74-year-old female presenting for admission nursing facility.  Patient was admitted to the hospital and treated for RYLAND and tachycardia.  Patient's labs stabilized.  Patient has a past history significant for lung cancer with mets, anemia, COPD, CHF, diabetes, hypertension, hyperlipidemia and sciatica, patient also continues with wound infection followed by facility wound team and continues on antibiotics..  Therapy evaluated patient recommending return to SNF for continued therapy and medical management.  Patient was hemodynamically stable to discharge to SNF for continued medical management and further therapy.  Patient appears to be in no acute distress.  Denies constitutional symptoms.  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    Family History   Problem Relation Name Age of  Onset   • No Known Problems Mother     • No Known Problems Father         Social History     Socioeconomic History   • Marital status:        Vital signs: 130/65, 90, 16, 98.0, 98% blood sugar 88    Objective  Physical Exam  Constitutional:       General: She is not in acute distress.  Eyes:      Extraocular Movements: Extraocular movements intact.   Pulmonary:      Effort: Pulmonary effort is normal.   Musculoskeletal:      Cervical back: Neck supple.   Skin:     Comments: Dressing right foot clean dry and intact   Neurological:      Mental Status: She is alert.   Psychiatric:         Mood and Affect: Mood normal.         Behavior: Behavior is cooperative.         Assessment/Plan  Problem List Items Addressed This Visit       COPD (chronic obstructive pulmonary disease) (Multi)     Monitor for shortness of breath or wheezing.  Inhaler         Essential (primary) hypertension - Primary     Monitor blood pressure  Atenolol         Diabetes mellitus (Multi)     Monitor for signs symptoms of hyper or hypoglycemia.  Monitor labs  LCS diet.  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, unspecified site (Multi)     With mets  Follows outpatient  Receiving treatment  Pain control  Lumakras         Hyperlipidemia     Statin  Ezetimibe  Monitor labs         Cellulitis     Continue amoxicillin 12/8/2024  Continue doxycycline 12/8/2024  Treatment per facility EMR  Followed by facility wound team         RYLAND (acute kidney injury) (CMS-[facility])     Stable  Monitor labs          Hospital records reviewed  Medications, treatments, and labs reviewed  Continue medications and treatments as listed in EMR  Discussed with nursing and therapy      Scribe Attestation  I, Nick Berry   attest that this documentation has been prepared under the direction and in the presence of Janae Ivan MD    Provider Attestation - Scribe documentation  All medical  record entries made by the Scribe were at my direction and personally dictated by me. I have reviewed the chart and agree that the record accurately reflects my personal performance of the history, physical exam, discussion and plan.   Janae Ivan MD      Electronically Signed By: Janae Ivan MD   12/1/24  5:10 PM

## 2024-12-02 ENCOUNTER — NURSING HOME VISIT (OUTPATIENT)
Dept: POST ACUTE CARE | Facility: EXTERNAL LOCATION | Age: 74
End: 2024-12-02
Payer: MEDICARE

## 2024-12-02 DIAGNOSIS — D64.9 ANEMIA, UNSPECIFIED TYPE: ICD-10-CM

## 2024-12-02 DIAGNOSIS — J44.9 CHRONIC OBSTRUCTIVE PULMONARY DISEASE, UNSPECIFIED COPD TYPE (MULTI): ICD-10-CM

## 2024-12-02 DIAGNOSIS — R53.1 WEAKNESS: ICD-10-CM

## 2024-12-02 DIAGNOSIS — L03.90 CELLULITIS, UNSPECIFIED CELLULITIS SITE: ICD-10-CM

## 2024-12-02 DIAGNOSIS — E13.69 OTHER SPECIFIED DIABETES MELLITUS WITH OTHER SPECIFIED COMPLICATION, UNSPECIFIED WHETHER LONG TERM INSULIN USE (MULTI): ICD-10-CM

## 2024-12-02 DIAGNOSIS — E78.5 HYPERLIPIDEMIA, UNSPECIFIED HYPERLIPIDEMIA TYPE: ICD-10-CM

## 2024-12-02 DIAGNOSIS — I10 ESSENTIAL (PRIMARY) HYPERTENSION: ICD-10-CM

## 2024-12-02 DIAGNOSIS — C84.70 ANAPLASTIC ALK-NEGATIVE LARGE CELL LYMPHOMA, UNSPECIFIED BODY REGION (MULTI): Primary | ICD-10-CM

## 2024-12-02 PROCEDURE — 99309 SBSQ NF CARE MODERATE MDM 30: CPT | Performed by: INTERNAL MEDICINE

## 2024-12-02 NOTE — ASSESSMENT & PLAN NOTE
Pt was ordered 1L IVF with repeat labs if labs not improved pt will go to ED for eval   Monitor labs

## 2024-12-02 NOTE — PROGRESS NOTES
PROGRESS NOTE    Subjective   Chief complaint: Eliza Palafox is a 74 y.o. female who is an acute skilled patient being seen and evaluated for weakness and monthly general medical care and follow-up    HPI:  HPI  Patient presents for general medical care and f/u.  Patient seen and examined at bedside.  No issues per nursing.  Patient has no acute complaints.  Patient is currently working with therapy due to generalized weakness, working towards goals.  Patient continues on antibiotics for cellulitis, tolerating without adverse effects.  Patient with anaplastic large cell lymphoma with mets.  COPD stable, denies sob, wheezing, cough.  HTN BP at goal.  Denies chest pain and headache.  DM, denies polydipsia polyuria polyphagia.  HLD denies muscle aches.  Insert anemia.  Mentation at baseline, no acute distress.    Objective   Vital signs: 133/66, 100, 17, 97.7, 98% blood sugar 101    Physical Exam  Constitutional:       General: She is not in acute distress.  Eyes:      Extraocular Movements: Extraocular movements intact.   Pulmonary:      Effort: Pulmonary effort is normal.   Musculoskeletal:      Cervical back: Neck supple.   Skin:     Comments: Dressing right foot clean dry and intact   Neurological:      Mental Status: She is alert.      Motor: Weakness present.   Psychiatric:         Mood and Affect: Mood normal.         Behavior: Behavior is cooperative.         Assessment/Plan   Problem List Items Addressed This Visit       COPD (chronic obstructive pulmonary disease) (Multi)     Monitor for shortness of breath or wheezing.  Inhaler         Essential (primary) hypertension     Monitor blood pressure  Atenolol         Diabetes mellitus (Multi)     Monitor for signs symptoms of hyper or hypoglycemia.  Monitor labs  LCS diet.  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, unspecified site (Multi) - Primary     With mets  Follows  outpatient  Receiving treatment  Pain control  Lumakras         Weakness     Continue to work with therapy towards goals         Anemia     Monitor labs         Hyperlipidemia     Statin  Ezetimibe  Monitor labs         Cellulitis     Continue amoxicillin 12/8/2024  Continue doxycycline 12/8/2024  Treatment per facility EMR    Wound doctor has been consulted and will be responsible for the evaluation and comprehensive management of of the wound including appropriate control of complicating factors such as unrelieved pressure, infection, vascular and/or uncontrolled metabolic derangement, and/or nutritional deficiency in addition to appropriate debridement          Medications, treatments, and labs reviewed  Continue medications and treatments as listed in EMR      Scribe Attestation  I, Justo Berryibemerson   attest that this documentation has been prepared under the direction and in the presence of Janae Ivan MD    Provider Attestation - Scribe documentation  All medical record entries made by the Scribe were at my direction and personally dictated by me. I have reviewed the chart and agree that the record accurately reflects my personal performance of the history, physical exam, discussion and plan.   Janae Ivan MD

## 2024-12-02 NOTE — LETTER
Patient: Eliza Palafox  : 1950    Encounter Date: 2024    PROGRESS NOTE    Subjective  Chief complaint: Eliaz Palafox is a 74 y.o. female who is an acute skilled patient being seen and evaluated for weakness and monthly general medical care and follow-up    HPI:  HPI  Patient presents for general medical care and f/u.  Patient seen and examined at bedside.  No issues per nursing.  Patient has no acute complaints.  Patient is currently working with therapy due to generalized weakness, working towards goals.  Patient continues on antibiotics for cellulitis, tolerating without adverse effects.  Patient with anaplastic large cell lymphoma with mets.  COPD stable, denies sob, wheezing, cough.  HTN BP at goal.  Denies chest pain and headache.  DM, denies polydipsia polyuria polyphagia.  HLD denies muscle aches.  Insert anemia.  Mentation at baseline, no acute distress.    Objective  Vital signs: 133/66, 100, 17, 97.7, 98% blood sugar 101    Physical Exam  Constitutional:       General: She is not in acute distress.  Eyes:      Extraocular Movements: Extraocular movements intact.   Pulmonary:      Effort: Pulmonary effort is normal.   Musculoskeletal:      Cervical back: Neck supple.   Skin:     Comments: Dressing right foot clean dry and intact   Neurological:      Mental Status: She is alert.      Motor: Weakness present.   Psychiatric:         Mood and Affect: Mood normal.         Behavior: Behavior is cooperative.         Assessment/Plan  Problem List Items Addressed This Visit       COPD (chronic obstructive pulmonary disease) (Multi)     Monitor for shortness of breath or wheezing.  Inhaler         Essential (primary) hypertension     Monitor blood pressure  Atenolol         Diabetes mellitus (Multi)     Monitor for signs symptoms of hyper or hypoglycemia.  Monitor labs  LCS diet.  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,  unspecified site (Multi) - Primary     With mets  Follows outpatient  Receiving treatment  Pain control  Lumakras         Weakness     Continue to work with therapy towards goals         Anemia     Monitor labs         Hyperlipidemia     Statin  Ezetimibe  Monitor labs         Cellulitis     Continue amoxicillin 12/8/2024  Continue doxycycline 12/8/2024  Treatment per facility EMR    Wound doctor has been consulted and will be responsible for the evaluation and comprehensive management of of the wound including appropriate control of complicating factors such as unrelieved pressure, infection, vascular and/or uncontrolled metabolic derangement, and/or nutritional deficiency in addition to appropriate debridement          Medications, treatments, and labs reviewed  Continue medications and treatments as listed in EMR      Scribe Attestation  I, Nick Berry   attest that this documentation has been prepared under the direction and in the presence of Janae Ivan MD    Provider Attestation - Scribe documentation  All medical record entries made by the Scribe were at my direction and personally dictated by me. I have reviewed the chart and agree that the record accurately reflects my personal performance of the history, physical exam, discussion and plan.   Janae Ivan MD        Electronically Signed By: Janae Ivan MD   12/3/24  1:08 PM

## 2024-12-02 NOTE — ASSESSMENT & PLAN NOTE
Continue amoxicillin 12/8/2024  Continue doxycycline 12/8/2024  Treatment per facility EMR    Wound doctor has been consulted and will be responsible for the evaluation and comprehensive management of of the wound including appropriate control of complicating factors such as unrelieved pressure, infection, vascular and/or uncontrolled metabolic derangement, and/or nutritional deficiency in addition to appropriate debridement

## 2024-12-03 ENCOUNTER — NURSING HOME VISIT (OUTPATIENT)
Dept: POST ACUTE CARE | Facility: EXTERNAL LOCATION | Age: 74
End: 2024-12-03
Payer: MEDICARE

## 2024-12-03 DIAGNOSIS — I10 ESSENTIAL (PRIMARY) HYPERTENSION: ICD-10-CM

## 2024-12-03 DIAGNOSIS — J44.9 CHRONIC OBSTRUCTIVE PULMONARY DISEASE, UNSPECIFIED COPD TYPE (MULTI): ICD-10-CM

## 2024-12-03 DIAGNOSIS — E13.69 OTHER SPECIFIED DIABETES MELLITUS WITH OTHER SPECIFIED COMPLICATION, UNSPECIFIED WHETHER LONG TERM INSULIN USE (MULTI): ICD-10-CM

## 2024-12-03 DIAGNOSIS — R53.1 WEAKNESS: ICD-10-CM

## 2024-12-03 DIAGNOSIS — L03.90 CELLULITIS, UNSPECIFIED CELLULITIS SITE: Primary | ICD-10-CM

## 2024-12-03 PROCEDURE — 99308 SBSQ NF CARE LOW MDM 20: CPT | Performed by: INTERNAL MEDICINE

## 2024-12-03 NOTE — PROGRESS NOTES
PROGRESS NOTE    Subjective   Chief complaint: Eliza Palafox is a 74 y.o. female who is an acute skilled patient being seen and evaluated for weakness    HPI:  Patient seen and examined at bedside. Patient denies n/v/f/c. Continues working in therapy. No new complaints.    Objective   Vital signs: 109/70, 98.2, 94, 18, 98%    Physical Exam  Constitutional:       General: She is not in acute distress.  Eyes:      Extraocular Movements: Extraocular movements intact.   Pulmonary:      Effort: Pulmonary effort is normal.   Musculoskeletal:      Cervical back: Neck supple.   Neurological:      Mental Status: She is alert.   Psychiatric:         Mood and Affect: Mood normal.         Behavior: Behavior is cooperative.         Assessment/Plan   Problem List Items Addressed This Visit       COPD (chronic obstructive pulmonary disease) (Multi)     Monitor for shortness of breath or wheezing.  Inhaler         Essential (primary) hypertension     Monitor blood pressure  Continue current medications          Weakness - Primary     Therapy          Anemia     Monitor labs          Medications, treatments, and labs reviewed  Continue medications and treatments as listed in [facility]    Scribe Attestation  Sydney DESAI Scribe   attest that this documentation has been prepared under the direction and in the presence of DREA Chapman.    Provider Attestation - Scribe documentation  All medical record entries made by the Scribe were at my direction and personally dictated by me. I have reviewed the chart and agree that the record accurately reflects my personal performance of the history, physical exam, discussion and plan.    DREA Chapman

## 2024-12-03 NOTE — LETTER
Patient: Eliza Palafox  : 1950    Encounter Date: 2024    PROGRESS NOTE    Subjective  Chief complaint: Eliza Palafox is a 74 y.o. female who is an acute skilled patient being seen and evaluated for weakness    HPI:  HPI  Patient is continuing to work in therapy.  Therapy has been working with the patient to improve strength and endurance with ADLs, transfers, and mobility.  Patient continues to work toward goals.  Patient seen and examined at the bedside.  Patient is stable and has no new complaints.  Nursing staff voices no new concerns today.  Patient does continue on antibiotics for cellulitis, tolerating without adverse effects.    Objective  Vital signs: 109/70, 94, 18, 98.2, 98% blood sugar 54    Physical Exam  Constitutional:       General: She is not in acute distress.  Eyes:      Extraocular Movements: Extraocular movements intact.   Pulmonary:      Effort: Pulmonary effort is normal.   Musculoskeletal:      Cervical back: Neck supple.   Skin:     Comments: Dressing right foot clean dry and intact   Neurological:      Mental Status: She is alert.      Motor: Weakness present.   Psychiatric:         Mood and Affect: Mood normal.         Behavior: Behavior is cooperative.         Assessment/Plan  Problem List Items Addressed This Visit       COPD (chronic obstructive pulmonary disease) (Multi)     Monitor for shortness of breath or wheezing.  Inhaler         Essential (primary) hypertension     Monitor blood pressure  Atenolol         Diabetes mellitus (Multi)     Monitor for signs symptoms of hyper or hypoglycemia.  Monitor labs  LCS diet.  Glucoscan  Metformin  Sliding scale insulin  Insulin glargine         Weakness     Continue therapy, work towards goals         Cellulitis - Primary     Continue amoxicillin 2024  Continue doxycycline 2024  Treatment per facility EMR    Followed by facility wound team          Medications, treatments, and labs reviewed  Continue medications and  treatments as listed in EMR      Scribe Attestation  I, Nick Berry   attest that this documentation has been prepared under the direction and in the presence of Janae Ivan MD    Provider Attestation - Scribe documentation  All medical record entries made by the Scribe were at my direction and personally dictated by me. I have reviewed the chart and agree that the record accurately reflects my personal performance of the history, physical exam, discussion and plan.   Janae Ivan MD        Electronically Signed By: Janae Ivan MD   12/4/24  3:53 PM

## 2024-12-03 NOTE — PROGRESS NOTES
PROGRESS NOTE    Subjective   Chief complaint: Eliza Palafox is a 74 y.o. female who is an acute skilled patient being seen and evaluated for weakness    HPI:  HPI  Patient is continuing to work in therapy.  Therapy has been working with the patient to improve strength and endurance with ADLs, transfers, and mobility.  Patient continues to work toward goals.  Patient seen and examined at the bedside.  Patient is stable and has no new complaints.  Nursing staff voices no new concerns today.  Patient does continue on antibiotics for cellulitis, tolerating without adverse effects.    Objective   Vital signs: 109/70, 94, 18, 98.2, 98% blood sugar 54    Physical Exam  Constitutional:       General: She is not in acute distress.  Eyes:      Extraocular Movements: Extraocular movements intact.   Pulmonary:      Effort: Pulmonary effort is normal.   Musculoskeletal:      Cervical back: Neck supple.   Skin:     Comments: Dressing right foot clean dry and intact   Neurological:      Mental Status: She is alert.      Motor: Weakness present.   Psychiatric:         Mood and Affect: Mood normal.         Behavior: Behavior is cooperative.         Assessment/Plan   Problem List Items Addressed This Visit       COPD (chronic obstructive pulmonary disease) (Multi)     Monitor for shortness of breath or wheezing.  Inhaler         Essential (primary) hypertension     Monitor blood pressure  Atenolol         Diabetes mellitus (Multi)     Monitor for signs symptoms of hyper or hypoglycemia.  Monitor labs  LCS diet.  Glucoscan  Metformin  Sliding scale insulin  Insulin glargine         Weakness     Continue therapy, work towards goals         Cellulitis - Primary     Continue amoxicillin 12/8/2024  Continue doxycycline 12/8/2024  Treatment per facility EMR    Followed by facility wound team          Medications, treatments, and labs reviewed  Continue medications and treatments as listed in EMR      Scribe Attestation  Praveena DESAI  Nick Robledo   attest that this documentation has been prepared under the direction and in the presence of Janae Ivan MD    Provider Attestation - Scribe documentation  All medical record entries made by the Scribe were at my direction and personally dictated by me. I have reviewed the chart and agree that the record accurately reflects my personal performance of the history, physical exam, discussion and plan.   Janae Ivan MD

## 2024-12-04 ENCOUNTER — NURSING HOME VISIT (OUTPATIENT)
Dept: POST ACUTE CARE | Facility: EXTERNAL LOCATION | Age: 74
End: 2024-12-04
Payer: MEDICARE

## 2024-12-04 DIAGNOSIS — I10 ESSENTIAL (PRIMARY) HYPERTENSION: ICD-10-CM

## 2024-12-04 DIAGNOSIS — J44.9 CHRONIC OBSTRUCTIVE PULMONARY DISEASE, UNSPECIFIED COPD TYPE (MULTI): ICD-10-CM

## 2024-12-04 DIAGNOSIS — R53.1 WEAKNESS: Primary | ICD-10-CM

## 2024-12-04 DIAGNOSIS — D64.9 ANEMIA, UNSPECIFIED TYPE: ICD-10-CM

## 2024-12-04 PROCEDURE — 99308 SBSQ NF CARE LOW MDM 20: CPT | Performed by: REGISTERED NURSE

## 2024-12-04 NOTE — LETTER
Patient: Eliza Palafox  : 1950    Encounter Date: 2024    PROGRESS NOTE    Subjective  Chief complaint: Eliza Palafox is a 74 y.o. female who is an acute skilled patient being seen and evaluated for weakness    HPI:  Patient in therapy d/t generalized weakness. Patient presents for f/u. Continues to work toward goals in therapy. No new complaints at this time.    Objective  Vital signs: 121/68, 97.6, 87, 18, 97%    Physical Exam  Constitutional:       General: She is not in acute distress.  Eyes:      Extraocular Movements: Extraocular movements intact.   Pulmonary:      Effort: Pulmonary effort is normal.   Musculoskeletal:      Cervical back: Neck supple.   Neurological:      Mental Status: She is alert.   Psychiatric:         Mood and Affect: Mood normal.         Behavior: Behavior is cooperative.         Assessment/Plan  Problem List Items Addressed This Visit       COPD (chronic obstructive pulmonary disease) (Multi)     Monitor for shortness of breath or wheezing.  Inhaler         Essential (primary) hypertension     Monitor blood pressure  Continue current medications          Weakness - Primary     Therapy          Anemia     Monitor labs          Medications, treatments, and labs reviewed  Continue medications and treatments as listed in [facility]    Scribe Attestation  I, Nick Hayes   attest that this documentation has been prepared under the direction and in the presence of DREA Chapman.    Provider Attestation - Scribe documentation  All medical record entries made by the Scribe were at my direction and personally dictated by me. I have reviewed the chart and agree that the record accurately reflects my personal performance of the history, physical exam, discussion and plan.    DREA Chapman        Electronically Signed By: DREA Chapman   24  8:09 PM

## 2024-12-05 ENCOUNTER — NURSING HOME VISIT (OUTPATIENT)
Dept: POST ACUTE CARE | Facility: EXTERNAL LOCATION | Age: 74
End: 2024-12-05
Payer: MEDICARE

## 2024-12-05 DIAGNOSIS — R53.1 WEAKNESS: ICD-10-CM

## 2024-12-05 DIAGNOSIS — L03.90 CELLULITIS, UNSPECIFIED CELLULITIS SITE: ICD-10-CM

## 2024-12-05 DIAGNOSIS — E13.69 OTHER SPECIFIED DIABETES MELLITUS WITH OTHER SPECIFIED COMPLICATION, UNSPECIFIED WHETHER LONG TERM INSULIN USE (MULTI): ICD-10-CM

## 2024-12-05 DIAGNOSIS — J44.9 CHRONIC OBSTRUCTIVE PULMONARY DISEASE, UNSPECIFIED COPD TYPE (MULTI): Primary | ICD-10-CM

## 2024-12-05 PROCEDURE — 99308 SBSQ NF CARE LOW MDM 20: CPT | Performed by: INTERNAL MEDICINE

## 2024-12-05 NOTE — PROGRESS NOTES
PROGRESS NOTE    Subjective   Chief complaint: Eliza Palafox is a 74 y.o. female who is an acute skilled patient being seen and evaluated for weakness    HPI:  HPI  Patient is working in PT OT, working towards goals established.  Therapy has been working with the patient to improve strength and endurance with ADLs, transfers, and mobility.  Patient seen and examined at the bedside, appears to be in no acute distress.  Patient is stable and has no new complaints.  Nursing staff voices no new concerns today.    Objective   Vital signs: 121/68, 87, 18, 97.6, 97% blood sugar 63    Physical Exam  Constitutional:       General: She is not in acute distress.  Eyes:      Extraocular Movements: Extraocular movements intact.   Pulmonary:      Effort: Pulmonary effort is normal.   Musculoskeletal:      Cervical back: Neck supple.   Skin:     Comments: Dressing right foot clean dry and intact   Neurological:      Mental Status: She is alert.      Motor: Weakness present.   Psychiatric:         Mood and Affect: Mood normal.         Behavior: Behavior is cooperative.         Assessment/Plan   Problem List Items Addressed This Visit       COPD (chronic obstructive pulmonary disease) (Multi) - Primary     Monitor for shortness of breath or wheezing.  Inhaler         Diabetes mellitus (Multi)     Monitor for signs symptoms of hyper or hypoglycemia.  Monitor labs  LCS diet.  Glucoscan  Metformin  Sliding scale insulin  Insulin glargine         Weakness     Continue therapy         Cellulitis     Continue amoxicillin 12/8/2024  Continue doxycycline 12/8/2024  Treatment per facility EMR    Followed by facility wound team          Medications, treatments, and labs reviewed  Continue medications and treatments as listed in EMR      Scribe Attestation  I, Nick Berry   attest that this documentation has been prepared under the direction and in the presence of Janae Ivan MD    Provider Attestation - Scribe  documentation  All medical record entries made by the Scribe were at my direction and personally dictated by me. I have reviewed the chart and agree that the record accurately reflects my personal performance of the history, physical exam, discussion and plan.   Janae Ivan MD

## 2024-12-05 NOTE — LETTER
Patient: Eliza Palafox  : 1950    Encounter Date: 2024    PROGRESS NOTE    Subjective  Chief complaint: Eliza Palafox is a 74 y.o. female who is an acute skilled patient being seen and evaluated for weakness    HPI:  HPI  Patient is working in PT OT, working towards goals established.  Therapy has been working with the patient to improve strength and endurance with ADLs, transfers, and mobility.  Patient seen and examined at the bedside, appears to be in no acute distress.  Patient is stable and has no new complaints.  Nursing staff voices no new concerns today.    Objective  Vital signs: 121/68, 87, 18, 97.6, 97% blood sugar 63    Physical Exam  Constitutional:       General: She is not in acute distress.  Eyes:      Extraocular Movements: Extraocular movements intact.   Pulmonary:      Effort: Pulmonary effort is normal.   Musculoskeletal:      Cervical back: Neck supple.   Skin:     Comments: Dressing right foot clean dry and intact   Neurological:      Mental Status: She is alert.      Motor: Weakness present.   Psychiatric:         Mood and Affect: Mood normal.         Behavior: Behavior is cooperative.         Assessment/Plan  Problem List Items Addressed This Visit       COPD (chronic obstructive pulmonary disease) (Multi) - Primary     Monitor for shortness of breath or wheezing.  Inhaler         Diabetes mellitus (Multi)     Monitor for signs symptoms of hyper or hypoglycemia.  Monitor labs  LCS diet.  Glucoscan  Metformin  Sliding scale insulin  Insulin glargine         Weakness     Continue therapy         Cellulitis     Continue amoxicillin 2024  Continue doxycycline 2024  Treatment per facility EMR    Followed by facility wound team          Medications, treatments, and labs reviewed  Continue medications and treatments as listed in EMR      Scribe Attestation  I, Nick Berry   attest that this documentation has been prepared under the direction and in the presence  of Janae Ivan MD    Provider Attestation - Scribe documentation  All medical record entries made by the Scribe were at my direction and personally dictated by me. I have reviewed the chart and agree that the record accurately reflects my personal performance of the history, physical exam, discussion and plan.   Janae Ivan MD        Electronically Signed By: Janae Ivan MD   12/6/24 12:22 PM

## 2024-12-05 NOTE — PROGRESS NOTES
PROGRESS NOTE    Subjective   Chief complaint: Eliza Palafox is a 74 y.o. female who is an acute skilled patient being seen and evaluated for weakness    HPI:  Patient in therapy d/t generalized weakness. Patient presents for f/u. Continues to work toward goals in therapy. No new complaints at this time.    Objective   Vital signs: 121/68, 97.6, 87, 18, 97%    Physical Exam  Constitutional:       General: She is not in acute distress.  Eyes:      Extraocular Movements: Extraocular movements intact.   Pulmonary:      Effort: Pulmonary effort is normal.   Musculoskeletal:      Cervical back: Neck supple.   Neurological:      Mental Status: She is alert.   Psychiatric:         Mood and Affect: Mood normal.         Behavior: Behavior is cooperative.         Assessment/Plan   Problem List Items Addressed This Visit       COPD (chronic obstructive pulmonary disease) (Multi)     Monitor for shortness of breath or wheezing.  Inhaler         Essential (primary) hypertension     Monitor blood pressure  Continue current medications          Weakness - Primary     Therapy          Anemia     Monitor labs          Medications, treatments, and labs reviewed  Continue medications and treatments as listed in [facility]    Scribe Attestation  ISydney Scribe   attest that this documentation has been prepared under the direction and in the presence of DREA Chapman.    Provider Attestation - Scribe documentation  All medical record entries made by the Scribe were at my direction and personally dictated by me. I have reviewed the chart and agree that the record accurately reflects my personal performance of the history, physical exam, discussion and plan.    DREA Chapman

## 2024-12-06 ENCOUNTER — NURSING HOME VISIT (OUTPATIENT)
Dept: POST ACUTE CARE | Facility: EXTERNAL LOCATION | Age: 74
End: 2024-12-06
Payer: MEDICARE

## 2024-12-06 DIAGNOSIS — R53.1 WEAKNESS: Primary | ICD-10-CM

## 2024-12-06 DIAGNOSIS — J44.9 CHRONIC OBSTRUCTIVE PULMONARY DISEASE, UNSPECIFIED COPD TYPE (MULTI): ICD-10-CM

## 2024-12-06 DIAGNOSIS — C84.70 ANAPLASTIC ALK-NEGATIVE LARGE CELL LYMPHOMA, UNSPECIFIED BODY REGION (MULTI): ICD-10-CM

## 2024-12-06 PROCEDURE — 99308 SBSQ NF CARE LOW MDM 20: CPT | Performed by: REGISTERED NURSE

## 2024-12-06 NOTE — LETTER
Patient: Eliza Palafox  : 1950    Encounter Date: 2024    PROGRESS NOTE    Subjective  Chief complaint: Eliza Palafox is a 74 y.o. female who is an acute skilled patient being seen and evaluated for weakness    HPI:  Patient has no new complaints or concerns today. Patient is working in therapy. Denies n/v/f/c.    Objective  Vital signs: 124/68, 97.8, 102, 17, 97%    Physical Exam  Constitutional:       General: She is not in acute distress.  Eyes:      Extraocular Movements: Extraocular movements intact.   Pulmonary:      Effort: Pulmonary effort is normal.   Musculoskeletal:      Cervical back: Neck supple.   Neurological:      Mental Status: She is alert.   Psychiatric:         Mood and Affect: Mood normal.         Behavior: Behavior is cooperative.         Assessment/Plan  Problem List Items Addressed This Visit       COPD (chronic obstructive pulmonary disease) (Multi)     Monitor for shortness of breath or wheezing.  Inhaler         Anaplastic large cell lymphoma, ALK-negative, unspecified site (Multi)     With mets  Follows outpatient  Receiving treatment  Pain control         Weakness - Primary     Therapy           Medications, treatments, and labs reviewed  Continue medications and treatments as listed in [facility]    Scribe Attestation  I, Nick Hayes   attest that this documentation has been prepared under the direction and in the presence of DREA Chapman.    Provider Attestation - Scribe documentation  All medical record entries made by the Scribe were at my direction and personally dictated by me. I have reviewed the chart and agree that the record accurately reflects my personal performance of the history, physical exam, discussion and plan.    DREA Chapman        Electronically Signed By: RDEA Chapman   24  8:48 AM

## 2024-12-10 NOTE — PROGRESS NOTES
PROGRESS NOTE    Subjective   Chief complaint: Eliza Palafox is a 74 y.o. female who is an acute skilled patient being seen and evaluated for weakness    HPI:  Patient has no new complaints or concerns today. Patient is working in therapy. Denies n/v/f/c.    Objective   Vital signs: 124/68, 97.8, 102, 17, 97%    Physical Exam  Constitutional:       General: She is not in acute distress.  Eyes:      Extraocular Movements: Extraocular movements intact.   Pulmonary:      Effort: Pulmonary effort is normal.   Musculoskeletal:      Cervical back: Neck supple.   Neurological:      Mental Status: She is alert.   Psychiatric:         Mood and Affect: Mood normal.         Behavior: Behavior is cooperative.         Assessment/Plan   Problem List Items Addressed This Visit       COPD (chronic obstructive pulmonary disease) (Multi)     Monitor for shortness of breath or wheezing.  Inhaler         Anaplastic large cell lymphoma, ALK-negative, unspecified site (Multi)     With mets  Follows outpatient  Receiving treatment  Pain control         Weakness - Primary     Therapy           Medications, treatments, and labs reviewed  Continue medications and treatments as listed in [facility]    Scribe Attestation  I, Nick Hayes   attest that this documentation has been prepared under the direction and in the presence of DREA Chapman.    Provider Attestation - Scribe documentation  All medical record entries made by the Scribe were at my direction and personally dictated by me. I have reviewed the chart and agree that the record accurately reflects my personal performance of the history, physical exam, discussion and plan.    DREA Chapman